# Patient Record
Sex: MALE | Race: WHITE | NOT HISPANIC OR LATINO | Employment: FULL TIME | URBAN - METROPOLITAN AREA
[De-identification: names, ages, dates, MRNs, and addresses within clinical notes are randomized per-mention and may not be internally consistent; named-entity substitution may affect disease eponyms.]

---

## 2017-05-01 ENCOUNTER — NURSE TRIAGE (OUTPATIENT)
Dept: ADMINISTRATIVE | Facility: CLINIC | Age: 27
End: 2017-05-01

## 2024-12-23 ENCOUNTER — OFFICE VISIT (OUTPATIENT)
Dept: URGENT CARE | Facility: CLINIC | Age: 34
End: 2024-12-23
Payer: COMMERCIAL

## 2024-12-23 VITALS
DIASTOLIC BLOOD PRESSURE: 84 MMHG | RESPIRATION RATE: 19 BRPM | OXYGEN SATURATION: 97 % | HEART RATE: 69 BPM | HEIGHT: 65 IN | WEIGHT: 161.94 LBS | SYSTOLIC BLOOD PRESSURE: 135 MMHG | BODY MASS INDEX: 26.98 KG/M2 | TEMPERATURE: 98 F

## 2024-12-23 DIAGNOSIS — L02.419 ABSCESS OF WRIST: Primary | ICD-10-CM

## 2024-12-23 RX ORDER — MUPIROCIN 20 MG/G
OINTMENT TOPICAL 3 TIMES DAILY
Qty: 15 G | Refills: 0 | Status: SHIPPED | OUTPATIENT
Start: 2024-12-23

## 2024-12-23 RX ORDER — SULFAMETHOXAZOLE AND TRIMETHOPRIM 800; 160 MG/1; MG/1
2 TABLET ORAL 2 TIMES DAILY
Qty: 28 TABLET | Refills: 0 | Status: SHIPPED | OUTPATIENT
Start: 2024-12-23 | End: 2024-12-30

## 2024-12-23 NOTE — PROGRESS NOTES
"Subjective:      Patient ID: Reinaldo Moran is a 34 y.o. male.    Vitals:  height is 5' 4.5" (1.638 m) and weight is 73.5 kg (161 lb 14.9 oz). His oral temperature is 98.3 °F (36.8 °C). His blood pressure is 135/84 and his pulse is 69. His respiration is 19 and oxygen saturation is 97%.     Chief Complaint: Abscess    33 yo M here with abscess to R wrist. Pt reports 3-4 days ago he noticed what appeared to be a pimple on his right wrist and he was able to get purulent drainage from it. It is now red, swollen, and warm to the touch.    Abscess  Chronicity:  NewProgression Since Onset: worsening  Abscess location: Right wrist.  Associated Symptoms: no fever, no chills, no sweats  Characteristics: painful, redness and swelling    Characteristics: not draining and no itching    Pain Scale:  7/10  Treatments Tried:  Draining/squeezing  Relieved by:  Nothing  Worsened by:  Draining/squeezing      Constitution: Negative for chills and fever.   Skin:  Positive for erythema (firm, erythematous boil with opening to center. No drainage at this time, no fluctuance) and abscess.      Objective:     Physical Exam   HENT:   Head: Normocephalic and atraumatic.   Ears:   Right Ear: External ear normal.   Left Ear: External ear normal.   Nose: Nose normal.   Eyes: Conjunctivae are normal.   Cardiovascular: Normal rate.   Pulmonary/Chest: Effort normal.   Abdominal: Normal appearance.   Neurological: He is alert.   Skin: Skin is warm and dry. erythema (firm, erythematous boil with opening to center. No drainage at this time, no fluctuance)   Psychiatric: His behavior is normal.   Nursing note and vitals reviewed.      Assessment:     1. Abscess of wrist        Plan:       Abscess of wrist  -     sulfamethoxazole-trimethoprim 800-160mg (BACTRIM DS) 800-160 mg Tab; Take 2 tablets by mouth 2 (two) times daily. for 7 days  Dispense: 28 tablet; Refill: 0  -     mupirocin (BACTROBAN) 2 % ointment; Apply topically 3 (three) times daily.  " Dispense: 15 g; Refill: 0      Patient Instructions   1.  Take all medications as directed. Soak twice daily in warm water.  May apply Bactroban to open wound.  Monitor closely for worsening redness, pain or swelling and seek another evaluation if infection does not improve or if it worsens at any time.   2.  Rest and keep yourself/patient well hydrated.  3.  You can alternate Tylenol and Motrin every 4-6 hours for fever above 100.4F and/or pain.   4. You should schedule a follow-up appointment with your Primary Care Provider for recheck in 2-3 days or as directed at this visit.   5.  If your condition fails to improve in a timely manner, you should receive another evaluation by your Primary Care Provider to discuss your concerns or return to urgent care for a recheck.  If your condition worsens at any time, you should report immediately to your nearest Emergency Department for further evaluation. **You must understand that you have received Urgent Care treatment only and that you may be released before all of your medical problems are known or treated. You, the patient, are responsible to arrange for follow-up care as instructed.